# Patient Record
Sex: MALE | Race: OTHER | HISPANIC OR LATINO | URBAN - METROPOLITAN AREA
[De-identification: names, ages, dates, MRNs, and addresses within clinical notes are randomized per-mention and may not be internally consistent; named-entity substitution may affect disease eponyms.]

---
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
## 2022-03-08 ENCOUNTER — EMERGENCY (EMERGENCY)
Facility: HOSPITAL | Age: 22
LOS: 1 days | Discharge: HOME | End: 2022-03-08
Attending: EMERGENCY MEDICINE
Payer: COMMERCIAL

## 2022-03-08 VITALS
RESPIRATION RATE: 18 BRPM | HEIGHT: 64 IN | TEMPERATURE: 98 F | WEIGHT: 136.91 LBS | DIASTOLIC BLOOD PRESSURE: 71 MMHG | SYSTOLIC BLOOD PRESSURE: 115 MMHG | OXYGEN SATURATION: 99 % | HEART RATE: 70 BPM

## 2022-03-08 LAB
ALBUMIN SERPL ELPH-MCNC: 4.6 G/DL — SIGNIFICANT CHANGE UP (ref 3.3–5.2)
ALP SERPL-CCNC: 72 U/L — SIGNIFICANT CHANGE UP (ref 40–120)
ALT FLD-CCNC: 12 U/L — SIGNIFICANT CHANGE UP
ANION GAP SERPL CALC-SCNC: 11 MMOL/L — SIGNIFICANT CHANGE UP (ref 5–17)
AST SERPL-CCNC: 24 U/L — SIGNIFICANT CHANGE UP
BASOPHILS # BLD AUTO: 0.02 K/UL — SIGNIFICANT CHANGE UP (ref 0–0.2)
BASOPHILS NFR BLD AUTO: 0.3 % — SIGNIFICANT CHANGE UP (ref 0–2)
BILIRUB SERPL-MCNC: 0.3 MG/DL — LOW (ref 0.4–2)
BUN SERPL-MCNC: 7.2 MG/DL — LOW (ref 8–20)
CALCIUM SERPL-MCNC: 8.8 MG/DL — SIGNIFICANT CHANGE UP (ref 8.6–10.2)
CHLORIDE SERPL-SCNC: 103 MMOL/L — SIGNIFICANT CHANGE UP (ref 98–107)
CO2 SERPL-SCNC: 26 MMOL/L — SIGNIFICANT CHANGE UP (ref 22–29)
CREAT SERPL-MCNC: 0.62 MG/DL — SIGNIFICANT CHANGE UP (ref 0.5–1.3)
EGFR: 139 ML/MIN/1.73M2 — SIGNIFICANT CHANGE UP
EOSINOPHIL # BLD AUTO: 0.1 K/UL — SIGNIFICANT CHANGE UP (ref 0–0.5)
EOSINOPHIL NFR BLD AUTO: 1.6 % — SIGNIFICANT CHANGE UP (ref 0–6)
GLUCOSE SERPL-MCNC: 125 MG/DL — HIGH (ref 70–99)
HCT VFR BLD CALC: 38.8 % — LOW (ref 39–50)
HGB BLD-MCNC: 12.8 G/DL — LOW (ref 13–17)
IMM GRANULOCYTES NFR BLD AUTO: 0.2 % — SIGNIFICANT CHANGE UP (ref 0–1.5)
LIDOCAIN IGE QN: 22 U/L — SIGNIFICANT CHANGE UP (ref 22–51)
LYMPHOCYTES # BLD AUTO: 2.05 K/UL — SIGNIFICANT CHANGE UP (ref 1–3.3)
LYMPHOCYTES # BLD AUTO: 32.9 % — SIGNIFICANT CHANGE UP (ref 13–44)
MCHC RBC-ENTMCNC: 28.8 PG — SIGNIFICANT CHANGE UP (ref 27–34)
MCHC RBC-ENTMCNC: 33 GM/DL — SIGNIFICANT CHANGE UP (ref 32–36)
MCV RBC AUTO: 87.4 FL — SIGNIFICANT CHANGE UP (ref 80–100)
MONOCYTES # BLD AUTO: 0.62 K/UL — SIGNIFICANT CHANGE UP (ref 0–0.9)
MONOCYTES NFR BLD AUTO: 9.9 % — SIGNIFICANT CHANGE UP (ref 2–14)
NEUTROPHILS # BLD AUTO: 3.44 K/UL — SIGNIFICANT CHANGE UP (ref 1.8–7.4)
NEUTROPHILS NFR BLD AUTO: 55.1 % — SIGNIFICANT CHANGE UP (ref 43–77)
PLATELET # BLD AUTO: 177 K/UL — SIGNIFICANT CHANGE UP (ref 150–400)
POTASSIUM SERPL-MCNC: 3.6 MMOL/L — SIGNIFICANT CHANGE UP (ref 3.5–5.3)
POTASSIUM SERPL-SCNC: 3.6 MMOL/L — SIGNIFICANT CHANGE UP (ref 3.5–5.3)
PROT SERPL-MCNC: 7.5 G/DL — SIGNIFICANT CHANGE UP (ref 6.6–8.7)
RBC # BLD: 4.44 M/UL — SIGNIFICANT CHANGE UP (ref 4.2–5.8)
RBC # FLD: 12.2 % — SIGNIFICANT CHANGE UP (ref 10.3–14.5)
SODIUM SERPL-SCNC: 140 MMOL/L — SIGNIFICANT CHANGE UP (ref 135–145)
WBC # BLD: 6.24 K/UL — SIGNIFICANT CHANGE UP (ref 3.8–10.5)
WBC # FLD AUTO: 6.24 K/UL — SIGNIFICANT CHANGE UP (ref 3.8–10.5)

## 2022-03-08 PROCEDURE — 85025 COMPLETE CBC W/AUTO DIFF WBC: CPT

## 2022-03-08 PROCEDURE — 83690 ASSAY OF LIPASE: CPT

## 2022-03-08 PROCEDURE — 36415 COLL VENOUS BLD VENIPUNCTURE: CPT

## 2022-03-08 PROCEDURE — 99284 EMERGENCY DEPT VISIT MOD MDM: CPT | Mod: 25

## 2022-03-08 PROCEDURE — 96374 THER/PROPH/DIAG INJ IV PUSH: CPT

## 2022-03-08 PROCEDURE — T1013: CPT

## 2022-03-08 PROCEDURE — 99284 EMERGENCY DEPT VISIT MOD MDM: CPT

## 2022-03-08 PROCEDURE — 80053 COMPREHEN METABOLIC PANEL: CPT

## 2022-03-08 RX ORDER — FAMOTIDINE 10 MG/ML
20 INJECTION INTRAVENOUS ONCE
Refills: 0 | Status: COMPLETED | OUTPATIENT
Start: 2022-03-08 | End: 2022-03-08

## 2022-03-08 RX ADMIN — Medication 30 MILLILITER(S): at 21:46

## 2022-03-08 RX ADMIN — FAMOTIDINE 20 MILLIGRAM(S): 10 INJECTION INTRAVENOUS at 21:46

## 2022-03-08 NOTE — ED ADULT TRIAGE NOTE - PATIENT ON (OXYGEN DELIVERY METHOD)
How Severe Is It?: moderate Is This A New Presentation, Or A Follow-Up?: Follow Up Accutane room air

## 2022-03-08 NOTE — ED PROVIDER NOTE - PATIENT PORTAL LINK FT
You can access the FollowMyHealth Patient Portal offered by [facility] by registering at the following website: [email or web address]. By joining [employer]’s FollowMyHealth portal, you will also be able to view your health information using other applications (apps) compatible with our system.

## 2022-03-08 NOTE — ED PROVIDER NOTE - CLINICAL SUMMARY MEDICAL DECISION MAKING FREE TEXT BOX
Pt states that he feels better. Abd is soft, pain resolved, tolerated PO, labs wnl, well appearing, stable for dc

## 2022-03-08 NOTE — ED ADULT NURSE REASSESSMENT NOTE - NS ED NURSE REASSESS COMMENT FT1
Pt is alert and oriented. Pt states that he has been having midsternal abdominal pain for 3 days. Pt states that he has been also having diarrhea. Pt denies sob, chest pain, nausea, vomiting, dizziness and pain. Pt resp are even and unlabored, skin color edmond for race. Pt updated on plan of care.

## 2022-03-08 NOTE — ED PROVIDER NOTE - OBJECTIVE STATEMENT
21 year old male with no PMH presents with epigastric abd pain. Pt states that his pain started yesterday with epigastric cramping pain. No radiation, no alleviating/exacerbating factors. Associated diarrhea but no vomiting, fevers, chills, chest pain, SOB.

## 2023-01-03 NOTE — ED PROVIDER NOTE - NS ED ATTENDING STATEMENT MOD
I have personally performed a face to face diagnostic evaluation on this patient. I have reviewed the ACP note and agree with the history, exam and plan of care, except as noted. Hypertriglyceridemia Monitoring: I explained this is common when taking isotretinoin. If this worsens they will contact us.

## 2023-09-18 ENCOUNTER — OFFICE (OUTPATIENT)
Dept: URBAN - METROPOLITAN AREA CLINIC 94 | Facility: CLINIC | Age: 23
Setting detail: OPHTHALMOLOGY
End: 2023-09-18
Payer: MEDICAID

## 2023-09-18 DIAGNOSIS — H01.001: ICD-10-CM

## 2023-09-18 DIAGNOSIS — H16.223: ICD-10-CM

## 2023-09-18 DIAGNOSIS — H01.004: ICD-10-CM

## 2023-09-18 PROCEDURE — 99203 OFFICE O/P NEW LOW 30 MIN: CPT | Performed by: PHYSICIAN ASSISTANT

## 2023-09-18 ASSESSMENT — REFRACTION_CURRENTRX
OD_OVR_VA: 20/
OS_CYLINDER: -1.50
OD_SPHERE: -0.75
OD_AXIS: 004
OD_CYLINDER: -2.00
OS_SPHERE: -0.25
OD_VPRISM_DIRECTION: SV
OS_AXIS: 008
OS_OVR_VA: 20/
OS_VPRISM_DIRECTION: SV

## 2023-09-18 ASSESSMENT — SPHEQUIV_DERIVED
OD_SPHEQUIV: -1.75
OS_SPHEQUIV: -1
OS_SPHEQUIV: -1.875
OD_SPHEQUIV: -2.625

## 2023-09-18 ASSESSMENT — CONFRONTATIONAL VISUAL FIELD TEST (CVF)
OD_FINDINGS: FULL
OS_FINDINGS: FULL

## 2023-09-18 ASSESSMENT — AXIALLENGTH_DERIVED
OD_AL: 24.1008
OS_AL: 24.2484
OD_AL: 24.4619
OS_AL: 23.8935

## 2023-09-18 ASSESSMENT — KERATOMETRY
OD_AXISANGLE_DEGREES: 091
OS_AXISANGLE_DEGREES: 092
OD_K2POWER_DIOPTERS: 45.00
OD_K1POWER_DIOPTERS: 43.00
OS_K1POWER_DIOPTERS: 43.00
OS_K2POWER_DIOPTERS: 44.50

## 2023-09-18 ASSESSMENT — REFRACTION_AUTOREFRACTION
OS_CYLINDER: -0.75
OS_AXIS: 180
OD_AXIS: 005
OS_SPHERE: -1.50
OD_SPHERE: -1.75
OD_CYLINDER: -1.75

## 2023-09-18 ASSESSMENT — TONOMETRY
OS_IOP_MMHG: 14
OD_IOP_MMHG: 14

## 2023-09-18 ASSESSMENT — SUPERFICIAL PUNCTATE KERATITIS (SPK)
OD_SPK: T
OS_SPK: T

## 2023-09-18 ASSESSMENT — REFRACTION_MANIFEST
OS_SPHERE: -0.25
OS_CYLINDER: -1.50
OU_VA: 20/20
OD_VA1: 20/20
OD_CYLINDER: -2.00
OS_AXIS: 008
OD_SPHERE: -0.75
OD_AXIS: 004
OS_VA1: 20/20

## 2023-09-18 ASSESSMENT — VISUAL ACUITY
OD_BCVA: 20/25-1
OS_BCVA: 20/30-1

## 2023-09-18 ASSESSMENT — LID EXAM ASSESSMENTS
OS_BLEPHARITIS: T
OD_BLEPHARITIS: T

## 2023-10-09 ENCOUNTER — RX ONLY (RX ONLY)
Age: 23
End: 2023-10-09

## 2023-10-09 ENCOUNTER — OFFICE (OUTPATIENT)
Dept: URBAN - METROPOLITAN AREA CLINIC 116 | Facility: CLINIC | Age: 23
Setting detail: OPHTHALMOLOGY
End: 2023-10-09
Payer: COMMERCIAL

## 2023-10-09 VITALS — HEIGHT: 60 IN

## 2023-10-09 DIAGNOSIS — H01.004: ICD-10-CM

## 2023-10-09 DIAGNOSIS — H01.001: ICD-10-CM

## 2023-10-09 DIAGNOSIS — H16.223: ICD-10-CM

## 2023-10-09 PROCEDURE — 92014 COMPRE OPH EXAM EST PT 1/>: CPT | Performed by: OPTOMETRIST

## 2023-10-09 ASSESSMENT — SPHEQUIV_DERIVED
OS_SPHEQUIV: -1
OD_SPHEQUIV: -1.75
OD_SPHEQUIV: -2
OS_SPHEQUIV: -2.25
OS_SPHEQUIV: -1.375

## 2023-10-09 ASSESSMENT — KERATOMETRY
OD_K1POWER_DIOPTERS: 43.25
OS_AXISANGLE_DEGREES: 095
OD_AXISANGLE_DEGREES: 095
OS_K2POWER_DIOPTERS: 44.25
OS_K1POWER_DIOPTERS: 43.25
OD_K2POWER_DIOPTERS: 45.00

## 2023-10-09 ASSESSMENT — AXIALLENGTH_DERIVED
OD_AL: 24.053
OS_AL: 23.8935
OS_AL: 24.4037
OS_AL: 24.0443
OD_AL: 24.1547

## 2023-10-09 ASSESSMENT — CONFRONTATIONAL VISUAL FIELD TEST (CVF)
OS_FINDINGS: FULL
OD_FINDINGS: FULL

## 2023-10-09 ASSESSMENT — REFRACTION_CURRENTRX
OD_SPHERE: -0.75
OS_VPRISM_DIRECTION: SV
OS_SPHERE: -0.25
OS_CYLINDER: -1.50
OS_AXIS: 008
OS_CYLINDER: -1.50
OD_AXIS: 003
OD_SPHERE: -0.75
OD_OVR_VA: 20/
OS_OVR_VA: 20/
OD_CYLINDER: -2.00
OS_AXIS: 008
OD_AXIS: 004
OD_OVR_VA: 20/
OS_OVR_VA: 20/
OS_SPHERE: -0.25
OD_CYLINDER: -2.00
OD_VPRISM_DIRECTION: SV

## 2023-10-09 ASSESSMENT — REFRACTION_MANIFEST
OD_AXIS: 004
OS_AXIS: 005
OD_VA1: 20/20
OS_CYLINDER: -0.75
OS_VA1: 20/20
OS_CYLINDER: -1.50
OU_VA: 20/20
OD_AXIS: 005
OS_SPHERE: -1.00
OD_CYLINDER: -1.50
OD_VA1: 20/20
OD_SPHERE: -1.25
OS_SPHERE: -0.25
OD_CYLINDER: -2.00
OS_VA1: 20/20
OS_AXIS: 008
OD_SPHERE: -0.75

## 2023-10-09 ASSESSMENT — TONOMETRY
OS_IOP_MMHG: 17
OD_IOP_MMHG: 16

## 2023-10-09 ASSESSMENT — REFRACTION_AUTOREFRACTION
OS_SPHERE: -1.75
OD_CYLINDER: -1.75
OS_CYLINDER: -1.00
OD_SPHERE: --2.25
OS_AXIS: 005
OD_AXIS: 005

## 2023-10-09 ASSESSMENT — SUPERFICIAL PUNCTATE KERATITIS (SPK)
OS_SPK: T
OD_SPK: T

## 2023-10-09 ASSESSMENT — VISUAL ACUITY
OD_BCVA: 20/25-1
OS_BCVA: 20/30-1

## 2023-10-09 ASSESSMENT — LID EXAM ASSESSMENTS
OS_BLEPHARITIS: T
OD_BLEPHARITIS: T

## 2023-10-18 NOTE — ED PROVIDER NOTE - NS ED MD DISPO DISCHARGE CCDA
Tolerating snack well of pepsi and banana well, denies nausea, vomiting, no reactions noted.  Blood transfusing well Patient/Caregiver provided printed discharge information.

## 2024-01-24 NOTE — ED PROVIDER NOTE - ATTENDING CONTRIBUTION TO CARE
Detail Level: Detailed
After interviewing the patient, I agree with the HPI as discussed . My personal exam reveals findings consistent with those discussed. Available diagnostic studies were reviewed. I confirm the diagnosis as discussed with the ACP. The care plan articulated is consistent with our discussion of the patient's particulars. In brief, Hx [ Swirling to buttock area],Exam [Pilonidal cyst/Absess ], Plan[It was incised and drained And patient to follow up In a few days ]

## 2024-06-24 NOTE — ED STATDOCS - EXITCARE/DISCHARGE INSTRUCTIONS
Please call patient and let him know his white blood cells came back normal.  He is to continue the entire course of antibiotic. Launch [employer] and print the 'Prescriptions from this Visit' Report